# Patient Record
Sex: MALE | Race: AMERICAN INDIAN OR ALASKA NATIVE
[De-identification: names, ages, dates, MRNs, and addresses within clinical notes are randomized per-mention and may not be internally consistent; named-entity substitution may affect disease eponyms.]

---

## 2017-03-29 ENCOUNTER — HOSPITAL ENCOUNTER (EMERGENCY)
Dept: HOSPITAL 5 - ED | Age: 52
Discharge: HOME | End: 2017-03-29
Payer: COMMERCIAL

## 2017-03-29 VITALS — DIASTOLIC BLOOD PRESSURE: 114 MMHG | SYSTOLIC BLOOD PRESSURE: 195 MMHG

## 2017-03-29 DIAGNOSIS — M54.5: Primary | ICD-10-CM

## 2017-03-29 DIAGNOSIS — R03.0: ICD-10-CM

## 2017-03-29 DIAGNOSIS — M54.6: ICD-10-CM

## 2017-03-29 LAB
ALBUMIN SERPL-MCNC: 4.4 G/DL (ref 3.9–5)
ALBUMIN/GLOB SERPL: 1.4 %
ALP SERPL-CCNC: 103 UNITS/L (ref 35–129)
ALT SERPL-CCNC: 26 UNITS/L (ref 7–56)
ANION GAP SERPL CALC-SCNC: 19 MMOL/L
BILIRUB SERPL-MCNC: 0.9 MG/DL (ref 0.1–1.2)
BILIRUB UR QL STRIP: (no result)
BLOOD UR QL VISUAL: (no result)
BUN SERPL-MCNC: 13 MG/DL (ref 9–20)
BUN/CREAT SERPL: 18.57 %
CALCIUM SERPL-MCNC: 9.1 MG/DL (ref 8.4–10.2)
CHLORIDE SERPL-SCNC: 101.8 MMOL/L (ref 98–107)
CK SERPL-CCNC: 123 UNITS/L (ref 55–170)
CO2 SERPL-SCNC: 22 MMOL/L (ref 22–30)
GLUCOSE SERPL-MCNC: 93 MG/DL (ref 75–100)
HCT VFR BLD CALC: 50.3 % (ref 35.5–45.6)
HGB BLD-MCNC: 16.7 GM/DL (ref 11.8–15.2)
KETONES UR STRIP-MCNC: (no result) MG/DL
LEUKOCYTE ESTERASE UR QL STRIP: (no result)
LIPASE SERPL-CCNC: 13 UNITS/L (ref 13–60)
MCH RBC QN AUTO: 30 PG (ref 28–32)
MCHC RBC AUTO-ENTMCNC: 33 % (ref 32–34)
MCV RBC AUTO: 90 FL (ref 84–94)
MUCOUS THREADS #/AREA URNS HPF: (no result) /HPF
NITRITE UR QL STRIP: (no result)
PH UR STRIP: 8 [PH] (ref 5–7)
PLATELET # BLD: 272 K/MM3 (ref 140–440)
POTASSIUM SERPL-SCNC: 4.3 MMOL/L (ref 3.6–5)
PROT SERPL-MCNC: 7.6 G/DL (ref 6.3–8.2)
PROT UR STRIP-MCNC: (no result) MG/DL
RBC # BLD AUTO: 5.6 M/MM3 (ref 3.65–5.03)
RBC #/AREA URNS HPF: 2 /HPF (ref 0–6)
SODIUM SERPL-SCNC: 138 MMOL/L (ref 137–145)
UROBILINOGEN UR-MCNC: < 2 MG/DL (ref ?–2)
WBC # BLD AUTO: 11.1 K/MM3 (ref 4.5–11)
WBC #/AREA URNS HPF: < 1 /HPF (ref 0–6)

## 2017-03-29 PROCEDURE — 36415 COLL VENOUS BLD VENIPUNCTURE: CPT

## 2017-03-29 PROCEDURE — 81001 URINALYSIS AUTO W/SCOPE: CPT

## 2017-03-29 PROCEDURE — 83690 ASSAY OF LIPASE: CPT

## 2017-03-29 PROCEDURE — 80053 COMPREHEN METABOLIC PANEL: CPT

## 2017-03-29 PROCEDURE — 85027 COMPLETE CBC AUTOMATED: CPT

## 2017-03-29 PROCEDURE — 96375 TX/PRO/DX INJ NEW DRUG ADDON: CPT

## 2017-03-29 PROCEDURE — 82550 ASSAY OF CK (CPK): CPT

## 2017-03-29 PROCEDURE — 96361 HYDRATE IV INFUSION ADD-ON: CPT

## 2017-03-29 PROCEDURE — 74176 CT ABD & PELVIS W/O CONTRAST: CPT

## 2017-03-29 PROCEDURE — 99284 EMERGENCY DEPT VISIT MOD MDM: CPT

## 2017-03-29 PROCEDURE — 96374 THER/PROPH/DIAG INJ IV PUSH: CPT

## 2017-03-29 PROCEDURE — 93005 ELECTROCARDIOGRAM TRACING: CPT

## 2017-03-29 PROCEDURE — 93010 ELECTROCARDIOGRAM REPORT: CPT

## 2017-03-29 NOTE — EMERGENCY DEPARTMENT REPORT
ED General Adult HPI





- General


Chief complaint: Back Pain/Injury


Stated complaint: BACK PAIN


Time Seen by Provider: 03/29/17 12:51


Source: patient, RN notes reviewed


Mode of arrival: Ambulatory


Limitations: No Limitations





- History of Present Illness


Initial comments: 


This is a 52-year-old male.  He is previously unknown to me.  He does not have 

a primary care doctor.  Reports a past medical history of elevated blood 

pressure, not currently on prescription medications.  May have a history of 

kidney stones, he is not certain.





The patient does a lot of heavy lifting at work.





He presents to the ER with right paralumbar and parathoracic muscular back 

pain.  The pain is sharp, increased with palpation, range of motion, twisting, 

laying on his side.  It decreases with rest.  It does not radiate anywhere.  

There is no chest pain or shortness of breath.  There is no testicular pain.  

No irritative or obstructive urinary symptoms.  No bladder or bowel retention 

or incontinence.  No saddle anesthesia.  No hematuria.








-: Gradual


Location: back


Radiation: non-radiation


Severity scale (0 -10): 8


Quality: burning, stabbing, aching


Consistency: constant


Improves with: medication, rest


Worsens with: movement


Associated Symptoms: denies other symptoms





- Related Data


 Previous Rx's











 Medication  Instructions  Recorded  Last Taken  Type


 


Ketorolac [Toradol] 10 mg PO Q6H PRN #20 tablet 03/29/17 Unknown Rx


 


Methocarbamol [Robaxin TAB] 750 mg PO TID PRN #30 tab 03/29/17 Unknown Rx











 Allergies











Allergy/AdvReac Type Severity Reaction Status Date / Time


 


No Known Allergies Allergy   Unverified 03/29/17 11:54














ED Review of Systems


ROS: 


Stated complaint: BACK PAIN


Other details as noted in HPI





Constitutional: denies: malaise


Eyes: denies: vision change


ENT: denies: epistaxis


Respiratory: denies: cough


Cardiovascular: denies: chest pain


Gastrointestinal: denies: abdominal pain, nausea, diarrhea


Genitourinary: denies: urgency, dysuria


Musculoskeletal: back pain


Skin: denies: lesions


Neurological: denies: headache, weakness


Psychiatric: denies: anxiety





ED Past Medical Hx





- Past Medical History


Previous Medical History?: No





- Surgical History


Additional Surgical History: R arm & ankle surgery, hernia repair





- Social History


Smoking Status: Never Smoker


Substance Use Type: Alcohol





- Medications


Home Medications: 


 Home Medications











 Medication  Instructions  Recorded  Confirmed  Last Taken  Type


 


Ketorolac [Toradol] 10 mg PO Q6H PRN #20 tablet 03/29/17  Unknown Rx


 


Methocarbamol [Robaxin TAB] 750 mg PO TID PRN #30 tab 03/29/17  Unknown Rx














ED Physical Exam





- General


Limitations: No Limitations


General appearance: alert, in no apparent distress





- Head


Head exam: Present: atraumatic, normocephalic





- Eye


Eye exam: Present: normal appearance, EOMI.  Absent: nystagmus





- ENT


ENT exam: Present: normal exam, normal orophraynx, mucous membranes moist, 

normal external ear exam





- Neck


Neck exam: Present: normal inspection, full ROM.  Absent: tenderness, 

meningismus





- Respiratory


Respiratory exam: Present: normal lung sounds bilaterally.  Absent: respiratory 

distress, wheezes, rales, rhonchi, stridor, decreased breath sounds





- Cardiovascular


Cardiovascular Exam: Present: regular rate, normal rhythm, normal heart sounds.

  Absent: bradycardia, tachycardia, irregular rhythm, systolic murmur, 

diastolic murmur, rubs, gallop





- GI/Abdominal


GI/Abdominal exam: Present: soft, normal bowel sounds.  Absent: distended, 

tenderness, guarding, rebound, rigid, pulsatile mass





- Rectal


Rectal exam: Present: deferred





- Extremities Exam


Extremities exam: Present: normal inspection, full ROM, normal capillary refill

, other (2+ pulses are noted in 4 extremities).  Absent: tenderness, pedal edema

, joint swelling, calf tenderness





- Back Exam


Back exam: Present: normal inspection, CVA tenderness (R), paraspinal tenderness





- Neurological Exam


Neurological exam: Present: alert, oriented X3, normal gait, other (Extraocular 

movements intact.  Tongue midline.  No facial droop.  Facial sensation intact 

to light touch in the V1, V2, V3 distribution bilaterally.  5 and 5 strength in 

4 extremities..  Sensation is intact to light touch in 4 extremities.).  Absent

: motor sensory deficit





- Psychiatric


Psychiatric exam: Present: normal affect, normal mood





- Skin


Skin exam: Present: warm, dry, intact, normal color.  Absent: rash





ED Course


 Vital Signs











  03/29/17 03/29/17 03/29/17





  11:44 12:54 12:56


 


Temperature 98.7 F 98.5 F 


 


Pulse Rate 95 H 87 


 


Respiratory 20 16 16





Rate   


 


Blood Pressure 196/130  


 


Blood Pressure  185/116 





[Right]   


 


O2 Sat by Pulse 96 97 97





Oximetry   














- Reevaluation(s)


Reevaluation #1: 





03/29/17 14:36











Differential diagnosis: Muscular pain, muscular spasm, renal colic, AAA, 

urinary tract infection, pyelonephritis














Assessment and plan: 52-year-old male with mechanical back pain, does a lot of 

heavy lifting for work.  The pain is exquisitely reproducible.  Elevated blood 

pressure is appreciated, this is chronic, the patient is not taking any 

prescription medications.  He has no pulmonary embolus or DVT risk factors, he 

is low risk by well's criteria.  He has a normal neurologic exam, with 

appropriate strength, sensation, proprioception in 4 extremities.  Skin medical 

picture is not consistent with epidural compression syndrome, and the patient 

reported marked improvement after Toradol and hydromorphone.  He is instructed 

to follow-up with a primary care doctor for his elevated blood pressure.  The 

risks of not following up for elevated blood pressure were explicitly reviewed 

with the patient.  A noncontrast CT scan of the abdomen and pelvis did not do 

demonstrate  any acute pathology, and his laboratory studies and urinalysis 

were not consistent with renal insufficiency, pancreatitis, biliary colic, or 

urinary tract infection.

















ED Medical Decision Making





- Lab Data


Result diagrams: 


 03/29/17 13:27





 03/29/17 13:27








 Vital Signs











  03/29/17 03/29/17 03/29/17





  11:44 12:54 12:56


 


Temperature 98.7 F 98.5 F 


 


Pulse Rate 95 H 87 


 


Respiratory 20 16 16





Rate   


 


Blood Pressure 196/130  


 


Blood Pressure  185/116 





[Right]   


 


O2 Sat by Pulse 96 97 97





Oximetry   














 Lab Results











  03/29/17 03/29/17 03/29/17 Range/Units





  13:27 13:27 Unknown 


 


WBC  11.1 H    (4.5-11.0)  K/mm3


 


RBC  5.60 H    (3.65-5.03)  M/mm3


 


Hgb  16.7 H    (11.8-15.2)  gm/dl


 


Hct  50.3 H    (35.5-45.6)  %


 


MCV  90    (84-94)  fl


 


MCH  30    (28-32)  pg


 


MCHC  33    (32-34)  %


 


RDW  13.7    (13.2-15.2)  %


 


Plt Count  272    (140-440)  K/mm3


 


Sodium   138   (137-145)  mmol/L


 


Potassium   4.3   (3.6-5.0)  mmol/L


 


Chloride   101.8   ()  mmol/L


 


Carbon Dioxide   22   (22-30)  mmol/L


 


Anion Gap   19   mmol/L


 


BUN   13   (9-20)  mg/dL


 


Creatinine   0.7 L   (0.8-1.5)  mg/dL


 


Estimated GFR   > 60   ml/min


 


BUN/Creatinine Ratio   18.57   %


 


Glucose   93   ()  mg/dL


 


Calcium   9.1   (8.4-10.2)  mg/dL


 


Total Bilirubin   0.9   (0.1-1.2)  mg/dL


 


AST   24   (5-40)  units/L


 


ALT   26   (7-56)  units/L


 


Alkaline Phosphatase   103   ()  units/L


 


Total Creatine Kinase   123   ()  units/L


 


Total Protein   7.6   (6.3-8.2)  g/dL


 


Albumin   4.4   (3.9-5)  g/dL


 


Albumin/Globulin Ratio   1.4   %


 


Lipase   13   (13-60)  units/L


 


Urine Color    Yellow  (Yellow)  


 


Urine Turbidity    Clear  (Clear)  


 


Urine pH    8.0 H  (5.0-7.0)  


 


Ur Specific Gravity    1.018  (1.003-1.030)  


 


Urine Protein    <15 mg/dl  (Negative)  mg/dL


 


Urine Glucose (UA)    Neg  (Negative)  mg/dL


 


Urine Ketones    Neg  (Negative)  mg/dL


 


Urine Blood    Neg  (Negative)  


 


Urine Nitrite    Neg  (Negative)  


 


Urine Bilirubin    Neg  (Negative)  


 


Urine Urobilinogen    < 2.0  (<2.0)  mg/dL


 


Ur Leukocyte Esterase    Neg  (Negative)  


 


Urine WBC (Auto)    < 1.0  (0.0-6.0)  /HPF


 


Urine RBC (Auto)    2.0  (0.0-6.0)  /HPF


 


Urine Mucus    Few  /HPF

















- EKG Data


-: EKG Interpreted by Me


EKG shows normal: sinus rhythm, axis, intervals, QRS complexes, ST-T waves


Rate: normal





- EKG Data


When compared to previous EKG there are: previous EKG unavailable





- Radiology Data


Radiology results: report reviewed, image reviewed











Noncontrast CT scan of the abdomen and pelvis negative for acute disease.  

Scattered punctate renal calyceal stones noted in both kidneys.  4 cm cyst 

noted in the superior pole of the right kidney.  Normal appendix.








Critical care attestation.: 


If time is entered above; I have spent that time in minutes in the direct care 

of this critically ill patient, excluding procedure time.








ED Disposition


Clinical Impression: 


 Back pain, Elevated blood pressure





Disposition: DISCHARGED TO HOME OR SELFCARE


Is pt being admited?: No


Does the pt Need Aspirin: No


Condition: Stable


Instructions:  Back Pain (ED), Hypertension (ED)


Additional Instructions: 


Rest and avoid heavy lifting.  Take the medications as directed.  If taking the 

methocarbamol for pain, do not drive, consume alcohol, operate motor vehicles, 

or make important decisions.  This medication is sedating.











Follow-up with a primary care doctor within the next 7-10 days.  Please note 

that your blood pressure was elevated while in the emergency department.  This 

should be followed up by a primary care doctor within the recommended 

timeframe.  Long-term complications of hypertension/elevated blood pressure 

include stroke, heart attack, disability, death, paralysis, loss of quality of 

life.  Return to the ER right away with new pain, worsened pain, migration of 

pain, fevers or chills, intractable nausea or vomiting, inability to tolerate 

liquid feeds.





Referrals: 


PRIMARY CARE,MD [Primary Care Provider] - 3-5 Days


TONJA ROLAND MD [Staff Physician] - 3-5 Days


[facility] [Provider Group] - 3-5 Days


Forms:  Work/School Release Form(ED)

## 2017-03-29 NOTE — CAT SCAN REPORT
CT OF THE ABDOMEN AND PELVIS WITHOUT CONTRAST



HISTORY:  Back pain, flank pain.



TECHNIQUE: Helical CT without contrast.  Sagittal and coronal 

reformatted images.



FINDINGS:



There are scattered punctate renal calyceal stones in both kidneys. 4 

cm cyst is identified at the superior pole of the right kidney. The 

ureters are normal course and caliber. Normal bladder.



Normal liver, biliary system, pancreas, spleen and adrenal glands.



The bowel loops are normal caliber and wall thickness. Normal appendix.



The aorta is normal caliber. No evidence for aneurysm. No ascites, 

adenopathy or acute inflammation.



The lung bases are clear.  Normal heart size.  No suspicious bony 

lesion.





IMPRESSION:

Punctate bilateral renal stones, no hydronephrosis or ureteral stones. 

Right renal cyst.

## 2022-07-25 ENCOUNTER — HOSPITAL ENCOUNTER (EMERGENCY)
Dept: HOSPITAL 5 - ED | Age: 57
LOS: 1 days | Discharge: HOME | End: 2022-07-26
Payer: COMMERCIAL

## 2022-07-25 DIAGNOSIS — Z98.890: ICD-10-CM
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
## 2022-07-25 DIAGNOSIS — I10: ICD-10-CM

## 2022-07-25 DIAGNOSIS — M25.511: Primary | ICD-10-CM

## 2022-07-25 PROCEDURE — 72125 CT NECK SPINE W/O DYE: CPT

## 2022-07-25 PROCEDURE — 81001 URINALYSIS AUTO W/SCOPE: CPT

## 2022-07-25 PROCEDURE — 93005 ELECTROCARDIOGRAM TRACING: CPT

## 2022-07-25 PROCEDURE — 70450 CT HEAD/BRAIN W/O DYE: CPT

## 2022-07-25 PROCEDURE — 99284 EMERGENCY DEPT VISIT MOD MDM: CPT

## 2022-07-26 VITALS — DIASTOLIC BLOOD PRESSURE: 85 MMHG | SYSTOLIC BLOOD PRESSURE: 159 MMHG

## 2022-07-26 LAB
PH UR STRIP: 6 [PH] (ref 5–7)
PROT UR STRIP-MCNC: (no result) MG/DL
RBC #/AREA URNS HPF: < 1 /HPF (ref 0–6)
UROBILINOGEN UR-MCNC: 0.2 MG/DL (ref ?–2)
WBC #/AREA URNS HPF: < 1 /HPF (ref 0–6)

## 2022-07-26 NOTE — CAT SCAN REPORT
CT HEAD WITHOUT CONTRAST



INDICATION / CLINICAL INFORMATION: fall.



TECHNIQUE: CT head was performed without administration of intravenous contrast. All CT scans at this
 location are performed using CT dose reduction for ALARA by means of automated exposure control. 



COMPARISON: CT head 11/23/2018



FINDINGS:

CEREBRAL HEMISPHERES: Generalized atrophy and bilateral regions of periventricular white matter hypoa
ttenuation compatible with microvascular ischemia are demonstrated. No midline shift. Basal cisterns 
patent. 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 Additional more focal hyp
oattenuating regions within the periventricular white matter are demonstrated bilaterally.



HEMORRHAGE: None.

CEREBELLUM / BRAINSTEM: No significant abnormality.

ORBITS: No significant abnormality.

SOFT TISSUES: No significant abnormality.

SKULL: No significant abnormality.

PARANASAL SINUSES / MASTOID AIR CELLS: Near total opacification of the right maxillary sinus.

ADDITIONAL FINDINGS: None.





IMPRESSION:

1. Since comparison study there has been interval increase in bilateral focal regions of white matter
 more diffusely present through out the periventricular white matter. Differential considerations inc
lude progression of microvascular ischemia as well as demyelinating process. MRI brain prior to and f
ollowing intravenous contrast administration is recommended for further evaluation.

2. No acute traumatic injury.

3. Opacification right maxillary sinus possibly due to large because retention cyst.



Signer Name: Calvin Jaime II, MD 

Signed: 7/26/2022 1:44 AM

Workstation Name: [id number]

## 2022-07-26 NOTE — CAT SCAN REPORT
CT CERVICAL SPINE WITHOUT CONTRAST



INDICATION / CLINICAL INFORMATION: fall and pain.



TECHNIQUE: Axial CT images were obtained through the cervical spine. Sagittal and coronal reformatted
 images were produced. All CT scans at this location are performed using CT dose reduction for ALARA 
by means of automated exposure control. 



COMPARISON: None available.



FINDINGS:



MANDIBLE: No significant abnormality of the visualized mandible or TMJs.

SKULL BASE: No significant abnormality of the skull base.

CRANIOCERVICAL JUNCTION: No significant abnormality of the craniocervical junction.

CERVICAL SPINE: Cervical spine demonstrates normal alignment without evidence of acute fracture. No s
evere central stenosis.

SOFT TISSUES: No significant abnormality of soft tissues or musculature.

THYROID: No significant abnormality.

UPPER CHEST: No significant abnormality of the visualized chest.

ADDITIONAL FINDINGS: None.



IMPRESSION:

1. No evidence of acute osseous injury.





Signer Name: Calvin Jaime II, MD 

Signed: 7/26/2022 1:39 AM

Workstation Name: [id number]

## 2022-07-26 NOTE — EMERGENCY DEPARTMENT REPORT
ED Fall HPI





- General


Chief Complaint: Fall


Stated Complaint: FALL/BALANCE OFF


Time Seen by Provider: 07/26/22 00:39


Source: patient


Mode of arrival: Ambulatory





- History of Present Illness


Initial Comments: 





58 yo M brought in by family member with a fall in the bathroom. Pt was suppose 

to be taken a bath when the family member heard a fall. She tried to pick 

patient up then fell again. Pt now c/o right shoulder pain and headache. No 

other modifying or associated factors. 


MD Complaint: fall





- Related Data


                                  Previous Rx's











 Medication  Instructions  Recorded  Last Taken  Type


 


Ketorolac [Toradol] 10 mg PO Q6H PRN #20 tablet 03/29/17 Unknown Rx


 


methOCARBAMOL [Robaxin TAB] 750 mg PO TID PRN #30 tab 03/29/17 Unknown Rx


 


Amlodipine Besylate [Norvasc] 10 mg PO DAILY #30 tablet 11/26/18 Unknown Rx


 


Aspirin EC [Halfprin EC] 81 mg PO QDAY #30 tablet. 11/26/18 Unknown Rx


 


AtorvaSTATin [Lipitor] 40 mg PO QHS #30 tablet 11/26/18 Unknown Rx


 


Pantoprazole [Protonix TAB] 40 mg PO QDAY #30 tablet 11/26/18 Unknown Rx


 


lisinopriL [Zestril TAB] 20 mg PO QDAY #30 tablet 11/26/18 Unknown Rx











                                    Allergies











Allergy/AdvReac Type Severity Reaction Status Date / Time


 


No Known Allergies Allergy   Verified 11/23/18 18:31














ED Review of Systems


ROS: 


Stated complaint: FALL/BALANCE OFF


Other details as noted in HPI





Comment: All other systems reviewed and negative


Musculoskeletal: myalgia, other (right shoulder pain and headache )


Neurological: headache





ED Past Medical Hx





- Past Medical History


Hx Hypertension: Yes





- Surgical History


Additional Surgical History: R arm & ankle surgery, hernia repair





- Social History


Smoking Status: Never Smoker





- Medications


Home Medications: 


                                Home Medications











 Medication  Instructions  Recorded  Confirmed  Last Taken  Type


 


Ketorolac [Toradol] 10 mg PO Q6H PRN #20 tablet 03/29/17 11/24/18 Unknown Rx


 


methOCARBAMOL [Robaxin TAB] 750 mg PO TID PRN #30 tab 03/29/17 11/24/18 Unknown 

Rx


 


Amlodipine Besylate [Norvasc] 10 mg PO DAILY #30 tablet 11/26/18  Unknown Rx


 


Aspirin EC [Halfprin EC] 81 mg PO QDAY #30 tablet. 11/26/18  Unknown Rx


 


AtorvaSTATin [Lipitor] 40 mg PO QHS #30 tablet 11/26/18  Unknown Rx


 


Pantoprazole [Protonix TAB] 40 mg PO QDAY #30 tablet 11/26/18  Unknown Rx


 


lisinopriL [Zestril TAB] 20 mg PO QDAY #30 tablet 11/26/18  Unknown Rx














ED Physical Exam





- General


Limitations: No Limitations


General appearance: alert, in no apparent distress





- Head


Head exam: Present: atraumatic, normal inspection





- Eye


Eye exam: Present: normal appearance


Pupils: Present: normal accommodation





- ENT


ENT exam: Present: normal exam, normal orophraynx, mucous membranes moist





- Neck


Neck exam: Present: normal inspection, tenderness (to the posterior midline ), 

full ROM





- Respiratory


Respiratory exam: Present: normal lung sounds bilaterally.  Absent: respiratory 

distress, accessory muscle use





- Cardiovascular


Cardiovascular Exam: Present: regular rate, normal rhythm, normal heart sounds





- GI/Abdominal


GI/Abdominal exam: Present: soft, normal bowel sounds.  Absent: distended, 

tenderness





- Extremities Exam


Extremities exam: Present: normal inspection, full ROM, normal capillary refill.

 Absent: tenderness, pedal edema





- Back Exam


Back exam: Present: other (bruise to the upper right shoulder ).  Absent: 

tenderness





- Neurological Exam


Neurological exam: Present: alert, oriented X3





- Psychiatric


Psychiatric exam: Present: normal affect, normal mood





- Skin


Skin exam: Present: warm, intact





ED Course


                                   Vital Signs











  07/25/22 07/26/22 07/26/22





  23:36 01:10 01:15


 


Temperature 98.7 F  


 


Pulse Rate 101 H 98 H 99 H


 


Respiratory 20 18 17





Rate   


 


Blood Pressure   164/90


 


Blood Pressure 199/93  





[Left]   


 


O2 Sat by Pulse 94 93 94





Oximetry   














  07/26/22 07/26/22 07/26/22





  01:31 01:37 01:45


 


Temperature   


 


Pulse Rate 95 H  94 H


 


Respiratory 20 20 19





Rate   


 


Blood Pressure 158/85  149/91


 


Blood Pressure   





[Left]   


 


O2 Sat by Pulse 93 96 92





Oximetry   














  07/26/22 07/26/22 07/26/22





  02:01 02:15 02:31


 


Temperature   


 


Pulse Rate 93 H 90 93 H


 


Respiratory 22 21 14





Rate   


 


Blood Pressure 163/98 150/93 151/90


 


Blood Pressure   





[Left]   


 


O2 Sat by Pulse 93 94 94





Oximetry   














  07/26/22 07/26/22 07/26/22





  02:45 03:01 03:15


 


Temperature   


 


Pulse Rate 84 84 76


 


Respiratory 18 16 15





Rate   


 


Blood Pressure 143/85 143/82 138/80


 


Blood Pressure   





[Left]   


 


O2 Sat by Pulse 92 93 92





Oximetry   














  07/26/22 07/26/22 07/26/22





  03:31 03:45 04:01


 


Temperature   


 


Pulse Rate 97 H 84 89


 


Respiratory 18 20 15





Rate   


 


Blood Pressure 164/90 151/87 164/90


 


Blood Pressure   





[Left]   


 


O2 Sat by Pulse 93 95 95





Oximetry   














  07/26/22 07/26/22 07/26/22





  04:15 04:31 04:45


 


Temperature   


 


Pulse Rate 91 H 91 H 85


 


Respiratory 18 16 20





Rate   


 


Blood Pressure 136/80 151/98 144/83


 


Blood Pressure   





[Left]   


 


O2 Sat by Pulse 96 96 94





Oximetry   














  07/26/22 07/26/22 07/26/22





  05:01 05:15 05:31


 


Temperature   


 


Pulse Rate 80 79 89


 


Respiratory 18 21 19





Rate   


 


Blood Pressure 138/84 139/83 155/86


 


Blood Pressure   





[Left]   


 


O2 Sat by Pulse 93 95 94





Oximetry   














  07/26/22





  05:45


 


Temperature 


 


Pulse Rate 68


 


Respiratory 16





Rate 


 


Blood Pressure 159/85


 


Blood Pressure 





[Left] 


 


O2 Sat by Pulse 94





Oximetry 














ED Medical Decision Making





- Radiology Data





xray shoulder noted with no acute bony abnormality 





CT cervical noted with no acute findings 





- Medical Decision Making





here with fall in the bathroom associated with posterior neck tenderness and HA 

with right shoulder tenderness-- so will order xray right shouder to rule out fx

 or dislocation and CT head and Cervical for any intracranial abnormality-- 





CT head noted with 


 FINDINGS:  


 CEREBRAL HEMISPHERES: Generalized atrophy and bilateral regions of 

periventricular white matter 


hypoattenuation compatible with microvascular ischemia are demonstrated. No 

midline shift. Basal 


cisterns patent. Remote infarction right basal ganglia and adjacent caudate 

nucleus. Additional more


focal hypoattenuating regions within the periventricular white matter are 

demonstrated bilaterally.  


 


 HEMORRHAGE: None.  


 CEREBELLUM / BRAINSTEM: No significant abnormality.  


 ORBITS: No significant abnormality.  


 SOFT TISSUES: No significant abnormality.  


 SKULL: No significant abnormality.  


 PARANASAL SINUSES / MASTOID AIR CELLS: Near total opacification of the right 

maxillary sinus.  


 ADDITIONAL FINDINGS: None.  


 


 


 IMPRESSION:  


 1. Since comparison study there has been interval increase in bilateral focal 

regions of white 


matter more diffusely present through out the periventricular white matter. 

Differential 


considerations include progression of microvascular ischemia as well as 

demyelinating process. MRI 


brain prior to and following intravenous contrast administration is recommended 

for further 


evaluation.  


 2. No acute traumatic injury.  


 3. Opacification right maxillary sinus possibly due to large because retention 

cyst.  





With the above findings will have patient follow up with PCP for further 

evaluation regarding getting non urgent brain MRI for differential including 

demyelinating process 


 








CT cervical with on acute findings 





Xray right shoulder with no acute bony findings -- 


Critical care attestation.: 


If time is entered above; I have spent that time in minutes in the direct care 

of this critically ill patient, excluding procedure time.








ED Disposition


Clinical Impression: 


Fall


Qualifiers:


 Encounter type: initial encounter Qualified Code(s): W19.XXXA - Unspecified 

fall, initial encounter





Right shoulder pain


Qualifiers:


 Chronicity: acute Qualified Code(s): M25.511 - Pain in right shoulder





Disposition: 01 HOME / SELF CARE / HOMELESS


Is pt being admited?: No


Does the pt Need Aspirin: No


Condition: Stable


Instructions:  How to Use Cold Therapy, Easy-to-Read, Shoulder Pain, 

Easy-to-Read, Musculoskeletal Pain, Joint Pain, Easy-to-Read


Additional Instructions: 


It is very important that you call and follow-up with your primary doctor and 

discussed the finding on your brain CT scan for further evaluation with MRI in 

the next 1 to 2 weeks for progress





Please call or bring patient back to the emergency room if your symptoms worsen








Referrals: 


PRIMARY CARE,MD [Primary Care Provider] - 3-5 Days


Time of Disposition: 05:48

## 2022-07-26 NOTE — ELECTROCARDIOGRAPH REPORT
[facility]

                                       

Test Date:    2022               Test Time:    23:43:42

Pat Name:     RADHA JOHNSTON         Department:   

Patient ID:   [id number]          Room:          

Gender:       M                        Technician:   BRIANA

:          1965               Requested By: JULIETA ANDERSON

Order Number: [id number]              Reading MD:   Jus Horowitz

                                 Measurements

Intervals                              Axis          

Rate:         99                       P:            46

NE:           143                      QRS:          9

QRSD:         97                       T:            58

QT:           380                                    

QTc:          486                                    

                           Interpretive Statements

Sinus rhythm

Atrial premature complex

Left atrial enlargement

No previous ECG available for comparison

Electronically Signed On 2022 9:41:46 EDT by Jus Horowitz